# Patient Record
Sex: FEMALE | Race: WHITE
[De-identification: names, ages, dates, MRNs, and addresses within clinical notes are randomized per-mention and may not be internally consistent; named-entity substitution may affect disease eponyms.]

---

## 2020-07-05 ENCOUNTER — HOSPITAL ENCOUNTER (EMERGENCY)
Dept: HOSPITAL 88 - ER | Age: 25
Discharge: HOME | End: 2020-07-05
Payer: COMMERCIAL

## 2020-07-05 VITALS — BODY MASS INDEX: 35.82 KG/M2 | WEIGHT: 215 LBS | HEIGHT: 65 IN

## 2020-07-05 DIAGNOSIS — R50.9: ICD-10-CM

## 2020-07-05 DIAGNOSIS — R05: ICD-10-CM

## 2020-07-05 DIAGNOSIS — I50.9: Primary | ICD-10-CM

## 2020-07-05 DIAGNOSIS — R06.02: ICD-10-CM

## 2020-07-05 PROCEDURE — 99282 EMERGENCY DEPT VISIT SF MDM: CPT

## 2020-07-05 NOTE — EMERGENCY DEPARTMENT NOTE
History of Present Illnes


History of Present Illness


Chief Complaint:  COVID PUI


History of Present Illness


This is a 24 year old  female arrived to the ED with concerns of being 

Covid positive. Patient states she feels short of breath and has a cough. 

Patient states she is worried her albuterol inhaler is not working. Patient 

states took the test over a week ago and found out today that she was positive.








Chief Complaint Comment c/o sob states she found out yesterday she is covid 

positive states hx of asthma and doesn't feel like albuterol is working seen by 

dr spring


Historian:  Patient


Arrival Mode:  Car


Onset (how long ago):  day(s)


Severity:  mild


Onset quality:  gradual


Duration (how long):  day(s)


Timing of current episode:  intermittent


Progression:  waxing and waning


Chronicity:  new


Context:  Reports recent illness


Relieving factors:  none


Exacerbating factors:  none


Associated symptoms:  Reports cough, Reports fever/chills, Reports loss of 

appetite; 


   Denies headaches





Past Medical/Family History


Physician Review


I have reviewed the patient's past medical and family history.  Any updates have

been documented here.





Past Medical History


Recent Fever:  No


Clinical Suspicion of Infectio:  Yes


New/Unexplained Change in Ment:  No





Review of Systems


Review of Systems


Constitutional:  Reports as per HPI, Reports chills, Reports fever


EENTM:  Reports no symptoms


Cardiovascular:  Reports no symptoms


Respiratory:  Reports as per HPI, Reports cough, Reports pain with cough


Gastrointestinal:  Reports no symptoms


Genitourinary:  Reports no symptoms


Musculoskeletal:  Reports no symptoms


Integumentary:  Reports no symptoms


Neurological:  Reports no symptoms


Psychological:  Reports no symptoms


Endocrine:  Reports no symptoms


Hematological/Lymphatic:  Reports no symptoms





Physical Exam


Related Data


Allergies:  


Coded Allergies:  


     No Known Allergies (Unverified , 4/21/11)


Triage Vital Signs





Vital Signs








  Date Time  Temp Pulse Resp B/P (MAP) Pulse Ox O2 Delivery O2 Flow Rate FiO2


 


7/5/20 04:20 99.8 124 20 135/99 100 Room Air  








Vital signs reviewed:  Yes





Physical Exam


CONSTITUTIONAL





Constitutional:  Present well-developed, Present well-nourished


HENT


HENT:  Present normocephalic, Present atraumatic, Present oropharynx markos

ar/moist, Present nose normal


HENT L/R:  Present left ext ear normal, Present right ext ear normal


EYES





Eyes:  Reports PERRL, Reports conjunctivae normal


NECK


Neck:  Present ROM normal


PULMONARY


Pulmonary:  Present effort normal, Present breath sounds normal


CARDIOVASCULAR





Cardiovascular:  Present regular rhythm, Present heart sounds normal, Present 

capillary refill normal, Present normal rate


GASTROINTESTINAL





Abdominal:  Present soft, Present nontender, Present bowel sounds normal


GENITOURINARY





Genitourinary:  Present exam deferred


SKIN


Skin:  Present warm, Present dry


MUSCULOSKELETAL





Musculoskeletal:  Present ROM normal


NEUROLOGICAL





Neurological:  Present alert, Present oriented x 3, Present no gross motor or 

sensory deficits


PSYCHOLOGICAL


Psychological:  Present mood/affect normal, Present judgement normal





Assessment & Plan


Medical Decision Making


MDM


24-year-old well-appearing [place] arrives to the ED with complaints of cough 

fever loss of taste and smell. Patient is clinically presenting with signs and 

symptoms consistent with CovId 19 and tested positive. Patient's oxygen 

saturation remained 99% even on exertion, no evidence of tachypnea or dyspnea 

noted in the ED. Spoke present length about the importance of sleeping on her 

stomach and rotating from side to side. Z-Macario given, signs and symptoms for 

return discussed.


In the light of the Covid pandemic, disaster medicine care was given- patient 

understands that there are no indications for a chest x-ray at this time given 

normal oxygen saturation and respiratory status.





Assessment & Plan


Final Impression:  


(1) CHF exacerbation


Depart Disposition:  HOME, SELF-CARE


Last Vital Signs











  Date Time  Temp Pulse Resp B/P (MAP) Pulse Ox O2 Delivery O2 Flow Rate FiO2


 


7/5/20 04:20 99.8 124 20 135/99 100 Room Air  

















KATHLEEN SPRING DO             Jul 5, 2020 04:48